# Patient Record
Sex: MALE | Race: WHITE | NOT HISPANIC OR LATINO | URBAN - METROPOLITAN AREA
[De-identification: names, ages, dates, MRNs, and addresses within clinical notes are randomized per-mention and may not be internally consistent; named-entity substitution may affect disease eponyms.]

---

## 2017-04-11 ENCOUNTER — OFFICE (OUTPATIENT)
Dept: URBAN - METROPOLITAN AREA CLINIC 78 | Facility: CLINIC | Age: 47
End: 2017-04-11

## 2017-04-11 VITALS
WEIGHT: 213 LBS | HEIGHT: 72 IN | SYSTOLIC BLOOD PRESSURE: 137 MMHG | DIASTOLIC BLOOD PRESSURE: 101 MMHG | TEMPERATURE: 97.7 F | HEART RATE: 115 BPM

## 2017-04-11 DIAGNOSIS — K59.1 FUNCTIONAL DIARRHEA: ICD-10-CM

## 2017-04-11 DIAGNOSIS — R11.2 NAUSEA WITH VOMITING, UNSPECIFIED: ICD-10-CM

## 2017-04-11 DIAGNOSIS — R13.10 DYSPHAGIA, UNSPECIFIED: ICD-10-CM

## 2017-04-11 PROCEDURE — 99244 OFF/OP CNSLTJ NEW/EST MOD 40: CPT

## 2017-04-11 NOTE — INTERFACERESULTNOTES
only 2 ova and parasite done, other test could not be completed per Mesfin @[facility] pt would have to resubmit stool sample

## 2017-04-11 NOTE — SERVICEHPINOTES
ADELAIDA ESPITIA   is a   46   year old male who is being seen in consultation at the request of   AURA LOVE   for nausea and vomiting. Symptoms began abruptly on Wednesday--maybe after eating bad shrimp but he isn't sure if this was the trigger. He developed nausea, vomiting and then significant esophageal pain--feeling burning and pain in his esophagus as well as a lot of eructation. He sought ER care twice--normal lipase (had pancreatitis two yrs ago), no anemia and CMP relatively normal except for potassium slightly low at 3.6. He has been drinking a lot of iced tea and water. He usually takes NSAIDS 3 times a week but has stopped recently. He denies black stool and hematemesis. His pulse is high here today but has been this high in the ER--he hasn't been taking his anxiety meds and feels very anxious. He notes daily diarrhea of a type 6 on the BSS since this all began. Prior to the onset of acute nausea and vomiting, he has a history of GERD and dysphagia. Dysphagia occurs quite often to solid foods and he has to drink water to "force food down". No other GI related complaints today. Of note, he is ETOH free but is a recovering prescription drug addict (opiates).

## 2017-04-24 LAB
C DIFFICILE TOXIN GENE NAA: NEGATIVE
Lab: (no result)
Lab: (no result)
OVA + PARASITE EXAM: (no result)
OVA + PARASITE EXAM: (no result)
REQUEST PROBLEM: (no result)
RESULT: RESULT 1: (no result)
RESULT: RESULT 1: (no result)

## 2017-04-27 ENCOUNTER — OFFICE (OUTPATIENT)
Dept: URBAN - METROPOLITAN AREA CLINIC 32 | Facility: CLINIC | Age: 47
End: 2017-04-27

## 2017-04-27 ENCOUNTER — INDEPENDENT LABORATORY (OUTPATIENT)
Dept: URBAN - METROPOLITAN AREA PATHOLOGY 17 | Facility: PATHOLOGY | Age: 47
End: 2017-04-27

## 2017-04-27 VITALS
OXYGEN SATURATION: 95 % | WEIGHT: 213 LBS | DIASTOLIC BLOOD PRESSURE: 86 MMHG | TEMPERATURE: 97.9 F | SYSTOLIC BLOOD PRESSURE: 123 MMHG | HEIGHT: 72 IN | DIASTOLIC BLOOD PRESSURE: 74 MMHG | SYSTOLIC BLOOD PRESSURE: 132 MMHG | HEART RATE: 66 BPM | RESPIRATION RATE: 14 BRPM | HEART RATE: 76 BPM | OXYGEN SATURATION: 94 % | RESPIRATION RATE: 16 BRPM

## 2017-04-27 DIAGNOSIS — K29.60 OTHER GASTRITIS WITHOUT BLEEDING: ICD-10-CM

## 2017-04-27 DIAGNOSIS — K21.0 GASTRO-ESOPHAGEAL REFLUX DISEASE WITH ESOPHAGITIS: ICD-10-CM

## 2017-04-27 DIAGNOSIS — R13.19 OTHER DYSPHAGIA: ICD-10-CM

## 2017-04-27 DIAGNOSIS — R19.7 DIARRHEA, UNSPECIFIED: ICD-10-CM

## 2017-04-27 DIAGNOSIS — R11.2 NAUSEA WITH VOMITING, UNSPECIFIED: ICD-10-CM

## 2017-04-27 PROBLEM — K31.89 OTHER DISEASES OF STOMACH AND DUODENUM: Status: ACTIVE | Noted: 2017-04-27

## 2017-04-27 PROBLEM — K44.9 DIAPHRAGMATIC HERNIA WITHOUT OBSTRUCTION OR GANGRENE: Status: ACTIVE | Noted: 2017-04-27

## 2017-04-27 PROCEDURE — 00810: CPT | Mod: QS,AA

## 2017-04-27 PROCEDURE — 00810: CPT | Mod: AA,QS

## 2017-04-27 PROCEDURE — 88342 IMHCHEM/IMCYTCHM 1ST ANTB: CPT

## 2017-04-27 PROCEDURE — 88313 SPECIAL STAINS GROUP 2: CPT

## 2017-04-27 PROCEDURE — 88305 TISSUE EXAM BY PATHOLOGIST: CPT
